# Patient Record
Sex: MALE | URBAN - METROPOLITAN AREA
[De-identification: names, ages, dates, MRNs, and addresses within clinical notes are randomized per-mention and may not be internally consistent; named-entity substitution may affect disease eponyms.]

---

## 2024-07-26 ENCOUNTER — TELEPHONE (OUTPATIENT)
Dept: UROLOGY | Facility: CLINIC | Age: 34
End: 2024-07-26
Payer: COMMERCIAL

## 2024-07-26 NOTE — TELEPHONE ENCOUNTER
----- Message from Alondra Grewal LPN sent at 7/25/2024  4:06 PM CDT -----  Regarding: FW: Appt Access  Contact: [phone]    ----- Message -----  From: Hallie Fitch  Sent: 7/25/2024   3:51 PM CDT  To: [facility] Clinical Staff  Subject: Appt Access                                      Jenn/ spouse calling to schedule appt for patient for infertility , Referral in [employer] [facility]. Pls call

## 2024-08-28 ENCOUNTER — TELEPHONE (OUTPATIENT)
Dept: UROLOGY | Facility: CLINIC | Age: 34
End: 2024-08-28
Payer: COMMERCIAL

## 2024-08-29 ENCOUNTER — TELEPHONE (OUTPATIENT)
Dept: UROLOGY | Facility: CLINIC | Age: 34
End: 2024-08-29
Payer: COMMERCIAL

## 2024-08-29 NOTE — TELEPHONE ENCOUNTER
Call was placed to patient informing them of the appointment time that they have on 09/11 @ 3:15 PM.Notified them that I would monitor the schedule to make sure they can safely stay at that time due to the schedule fluctuating and meeting's of the providers discretion.Patient and girlfriend was given office name and number

## 2024-08-29 NOTE — TELEPHONE ENCOUNTER
----- Message from Allison Brambila sent at 8/29/2024  1:51 PM CDT -----  Who Called: Pt    What is the request in detail: Requesting call back to discuss recent appt and scheduling confirmation. Please advise    Can the clinic reply by [facility]? No    Best Call Back Number: [phone]      Additional Information:

## 2024-08-29 NOTE — TELEPHONE ENCOUNTER
Pt call returned regarding appt scheduling. States original appt was canceled, is looking to reschedule for fertility consult. Apologies given for original appt being canceled, as it was scheduled incorrectly- the provider will be in surgery in the afternoon. Next available offered; pt states he would only like afternoon appts d/t his wife's schedule and 3 hr commute. Offered afternoon appt on 9/5, pt states he will call back when he speaks with wife to discuss best appt time. Pt informed of clinic # and of messaging to office through patient portal.     ----- Message from Sallie Gonzalez sent at 8/28/2024  5:21 PM CDT -----  Type:  Patient Returning Call    Who Called:pt   Who Left Message for Patient:pt   Does the patient know what this is regarding?:pt just had a missed call and was calling back   Would the patient rather a call back or a response via [facility]? call  Best Call Back Number: [phone]  Additional Information: call

## 2024-09-09 ENCOUNTER — TELEPHONE (OUTPATIENT)
Dept: UROLOGY | Facility: CLINIC | Age: 34
End: 2024-09-09
Payer: COMMERCIAL

## 2024-09-09 NOTE — TELEPHONE ENCOUNTER
----- Message from Landy Cleaning sent at 9/9/2024 12:09 PM CDT -----  .Type:  Patient Call Back    Who Called: PT       Does the patient know what this is regarding?: PT CALLED TO SPEAK WITH THE OFFICE ABOUT HIS UPCOMING APPT     Would the patient rather a call back YES     Best Call Back Number: [phone]    Additional Information: Thank You
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
## 2024-10-07 ENCOUNTER — TELEPHONE (OUTPATIENT)
Dept: UROLOGY | Facility: CLINIC | Age: 34
End: 2024-10-07

## 2024-10-07 ENCOUNTER — LAB VISIT (OUTPATIENT)
Dept: LAB | Facility: HOSPITAL | Age: 34
End: 2024-10-07
Attending: UROLOGY
Payer: COMMERCIAL

## 2024-10-07 ENCOUNTER — OFFICE VISIT (OUTPATIENT)
Dept: UROLOGY | Facility: CLINIC | Age: 34
End: 2024-10-07
Payer: COMMERCIAL

## 2024-10-07 VITALS — SYSTOLIC BLOOD PRESSURE: 125 MMHG | WEIGHT: 216.69 LBS | HEART RATE: 63 BPM | DIASTOLIC BLOOD PRESSURE: 85 MMHG

## 2024-10-07 DIAGNOSIS — E29.1 TESTICULAR FAILURE: Primary | ICD-10-CM

## 2024-10-07 DIAGNOSIS — E29.1 TESTICULAR FAILURE: ICD-10-CM

## 2024-10-07 LAB
ESTRADIOL SERPL-MCNC: 21 PG/ML (ref 11–44)
FSH SERPL-ACNC: 22.64 MIU/ML (ref 0.95–11.95)
HBV CORE IGM SERPL QL IA: NORMAL
HBV SURFACE AG SERPL QL IA: NORMAL
HCV AB SERPL QL IA: NORMAL
HIV 1+2 AB+HIV1 P24 AG SERPL QL IA: NORMAL
LH SERPL-ACNC: 9.2 MIU/ML (ref 0.6–12.1)
PROLACTIN SERPL IA-MCNC: 5.8 NG/ML (ref 3.5–19.4)
TESTOST SERPL-MCNC: 336 NG/DL (ref 304–1227)
TREPONEMA PALLIDUM IGG+IGM AB [PRESENCE] IN SERUM OR PLASMA BY IMMUNOASSAY: NONREACTIVE

## 2024-10-07 PROCEDURE — 86803 HEPATITIS C AB TEST: CPT | Performed by: UROLOGY

## 2024-10-07 PROCEDURE — 99999 PR PBB SHADOW E&M-EST. PATIENT-LVL III: CPT | Mod: PBBFAC,,, | Performed by: UROLOGY

## 2024-10-07 PROCEDURE — 83002 ASSAY OF GONADOTROPIN (LH): CPT | Performed by: UROLOGY

## 2024-10-07 PROCEDURE — 86705 HEP B CORE ANTIBODY IGM: CPT | Performed by: UROLOGY

## 2024-10-07 PROCEDURE — 3074F SYST BP LT 130 MM HG: CPT | Mod: CPTII,S$GLB,, | Performed by: UROLOGY

## 2024-10-07 PROCEDURE — 86644 CMV ANTIBODY: CPT | Performed by: UROLOGY

## 2024-10-07 PROCEDURE — 82670 ASSAY OF TOTAL ESTRADIOL: CPT | Performed by: UROLOGY

## 2024-10-07 PROCEDURE — 87389 HIV-1 AG W/HIV-1&-2 AB AG IA: CPT | Performed by: UROLOGY

## 2024-10-07 PROCEDURE — 83001 ASSAY OF GONADOTROPIN (FSH): CPT | Performed by: UROLOGY

## 2024-10-07 PROCEDURE — 86645 CMV ANTIBODY IGM: CPT | Performed by: UROLOGY

## 2024-10-07 PROCEDURE — G2211 COMPLEX E/M VISIT ADD ON: HCPCS | Mod: S$GLB,,, | Performed by: UROLOGY

## 2024-10-07 PROCEDURE — 3079F DIAST BP 80-89 MM HG: CPT | Mod: CPTII,S$GLB,, | Performed by: UROLOGY

## 2024-10-07 PROCEDURE — 99205 OFFICE O/P NEW HI 60 MIN: CPT | Mod: S$GLB,,, | Performed by: UROLOGY

## 2024-10-07 PROCEDURE — 1160F RVW MEDS BY RX/DR IN RCRD: CPT | Mod: CPTII,S$GLB,, | Performed by: UROLOGY

## 2024-10-07 PROCEDURE — 1159F MED LIST DOCD IN RCRD: CPT | Mod: CPTII,S$GLB,, | Performed by: UROLOGY

## 2024-10-07 PROCEDURE — 87340 HEPATITIS B SURFACE AG IA: CPT | Performed by: UROLOGY

## 2024-10-07 PROCEDURE — 86593 SYPHILIS TEST NON-TREP QUANT: CPT | Performed by: UROLOGY

## 2024-10-07 PROCEDURE — 84403 ASSAY OF TOTAL TESTOSTERONE: CPT | Performed by: UROLOGY

## 2024-10-07 PROCEDURE — 84146 ASSAY OF PROLACTIN: CPT | Performed by: UROLOGY

## 2024-10-07 NOTE — PROGRESS NOTES
"Chief Complaint:  Infertility    HPI:    Mr. Shah is a 34 y.o.  male who has been with his girlfriend for the past 3 years. They have been trying to achieve a pregnancy for the past 1 years but without success. Vignesh Shah has undergone a SA x 2 showing azoospermia, but I don't have copies of them.  He smokes marijuana and chews tobacco everyday.    Karyotype and Y chromosome microdeletion is normal.    He has achieved 0 pregnancies in the past.    Jenn Fofana is 29 years old. ( [birth year]) Her menses are regular. She has a history of PCOS and she was noted to not be ovulating, she started taking progesterone which resolved this issue. She has achieved 0 prior pregnancies.  She sees Dr. Rivera in [place].     The couple has not undergone prior intrauterine insemination procedures.    The couple has not undergone prior in-vitro fertilization procedures.    Vignesh Shah denies a history of exposure to harmful chemicals, toxins, and radiation.    No history of recent fevers greater than 101.5 degrees Farenheit.    No history of recent exposure to "wet heat."    No history of urological trauma or testicular torsion.    No history of prostatitis, epididymitis, and orchitis.    No history of post-pubertal mumps.    There is no known family history of fertility problems    REVIEW OF SYSTEMS:     He denies headache, blurred vision, fever, nausea, vomiting, chills, abdominal pain, chest pain, sore throat, bleeding per rectum, cough, SOB, recent loss of consciousness, recent mental status changes, seizures, dizziness, or upper or lower extremity weakness.    PHYSICAL EXAM:     The patient generally appears in good health, is appropriately interactive, and is in no apparent distress.     Eyes: anicteric sclerae, moist conjunctivae; no lid-lag; PERRLA     HENT: Atraumatic; oropharynx clear with moist mucous membranes and no mucosal ulcerations;normal hard and soft palate.  No evidence of lymphadenopathy.    Neck: Trachea " "midline.  No thyromegaly.    Skin: No lesions.    Mental: Cooperative with normal affect.  Is oriented to time, place, and person.    Neuro: Grossly intact.    Chest: Normal inspiratory effort.   No accessory muscles.  No audible wheezes.  Respirations symmetric on inspiration and expiration.    Heart: Regular rhythm.      Abdomen:  Soft, non-tender. No masses or organomegaly. Bladder is not palpable. No evidence of flank discomfort. No evidence of inguinal hernia.    Genitourinary: Penis is normal with no evidence of plaques or induration. Urethral meatus is normal. Scrotum is normal. Testes are descended bilaterally with no evidence of abnormal masses or tenderness. Epididymis, vas deferens, and cord structures are normal bilaterally.  Testicular volume is approximately 14 cc on the R and 12 cc on the L.    Extremities: No cyanosis, clubbing, or edema.    IMPRESSION & PLAN:    Mr. Shah is a 34 y.o.  male who has been with his girlfriend fo 3 years. They have been trying to achieve a pregnancy for the past 1.5 years, but without success.  Vignesh Shah has undergone a semen analysis. He denies a history of erectile dysfunction and ejaculatory problems.    He has achieved 0 pregnancies in the past.    1.  FSH, LH, testosterone, prolactin, and estradiol serum levels today.  Independently interpreted his labs and outside SA's today.  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  Recommend TESE.  However, I would like to make sure his labs are optimal before proceeding with TESE.  Will phone review his labs.  3.  Risks and benefits of epididymal sperm aspiration/TESE  were discussed in detail today including failure to retrieve sperm, infection, bleeding, hypogonadism, pain, loss of testicle, need for IVF with subsequent failure to achieve a pregnancy.   He was given the chance to ask questions, and all questions were answered.   4.  Recommend avoiding "wet heat."  5.  Recommend taking a multivitamin and 500 " mg of vitamin c daily in addition to the multivitamin.  6.  Counseled to reduce marijuana and tobacco inhalation.  7.  Please send a copy of the note to Dr. Motta.  Thank you for the consultation.  8.  Visit today included increased complexity associated with the care of the episodic problem of severe male factor infertility addressed and managing the longitudinal care of the patient due to the serious and/or complex managed problem(s) requiring srugery.     CC: Kalia

## 2024-10-07 NOTE — LETTER
October 7, 2024        Prasanth Motta MD  [facility] [facility] [place] [address]             [facility] 4th Fl  [address]  [facility] [address]  Phone: [phone]   Patient: Vignesh Shah   MR Number: [id number]   YOB: 1990   Date of Visit: 10/7/2024       Dear Dr. Motta:    Thank you for referring Vignesh Shah to me for evaluation. Attached you will find relevant portions of my assessment and plan of care.    If you have questions, please do not hesitate to call me. I look forward to following Vignesh Shah along with you.    Sincerely,      Ryan Moya MD            CC  No Recipients    Enclosure

## 2024-10-07 NOTE — TELEPHONE ENCOUNTER
Per MD Patient notified his labs look good.  Can proceed with TESE now Patient stated he will look at hs work schedule and call back later Office number provided.

## 2024-10-08 LAB — CMV IGG SERPL QL IA: REACTIVE

## 2024-10-10 LAB — CMV IGM SERPL IA-ACNC: <8 AU/ML
